# Patient Record
Sex: FEMALE | Race: WHITE | NOT HISPANIC OR LATINO | Employment: OTHER | URBAN - METROPOLITAN AREA
[De-identification: names, ages, dates, MRNs, and addresses within clinical notes are randomized per-mention and may not be internally consistent; named-entity substitution may affect disease eponyms.]

---

## 2017-04-01 ENCOUNTER — OFFICE VISIT (OUTPATIENT)
Dept: URGENT CARE | Facility: URGENT CARE | Age: 77
End: 2017-04-01
Payer: COMMERCIAL

## 2017-04-01 VITALS
TEMPERATURE: 99.2 F | WEIGHT: 167 LBS | SYSTOLIC BLOOD PRESSURE: 114 MMHG | HEART RATE: 82 BPM | OXYGEN SATURATION: 100 % | DIASTOLIC BLOOD PRESSURE: 62 MMHG

## 2017-04-01 DIAGNOSIS — R68.89 INFLUENZA-LIKE SYMPTOMS: Primary | ICD-10-CM

## 2017-04-01 PROCEDURE — 99202 OFFICE O/P NEW SF 15 MIN: CPT | Performed by: FAMILY MEDICINE

## 2017-04-01 RX ORDER — OSELTAMIVIR PHOSPHATE 75 MG/1
75 CAPSULE ORAL 2 TIMES DAILY
Qty: 10 CAPSULE | Refills: 0 | Status: SHIPPED | OUTPATIENT
Start: 2017-04-01 | End: 2019-05-02

## 2017-04-01 NOTE — NURSING NOTE
Chief Complaint   Patient presents with     Cough     cough, wheezing, fever and chills since Monday.        Initial /62  Pulse 82  Temp 99.2  F (37.3  C) (Oral)  Wt 167 lb (75.8 kg)  SpO2 100% There is no height or weight on file to calculate BMI.  Medication Reconciliation: complete

## 2017-04-01 NOTE — PROGRESS NOTES
SUBJECTIVE:  Aviva Lundberg is a 76 year old female who presents to the clinic today with a chief complaint of cough  for 2 day(s).  Her cough is described as nonproductive.    The patient's symptoms are moderate and worsening.  Associated symptoms include fever, nasal congestion and myalgias. The patient's symptoms are exacerbated by no particular triggers  Patient has been using OTC cough suppressants  to improve symptoms.    No past medical history on file.    Current Outpatient Prescriptions   Medication Sig Dispense Refill     GuaiFENesin (CHEST CONGESTION RELIEF PO) Take by mouth as needed       oseltamivir (TAMIFLU) 75 MG capsule Take 1 capsule (75 mg) by mouth 2 times daily 10 capsule 0       Social History   Substance Use Topics     Smoking status: Never Smoker     Smokeless tobacco: Never Used     Alcohol use Not on file       ROS  INTEGUMENTARY/SKIN: NEGATIVE for worrisome rashes, moles or lesions  EYES: NEGATIVE for vision changes or irritation    OBJECTIVE:  /62  Pulse 82  Temp 99.2  F (37.3  C) (Oral)  Wt 167 lb (75.8 kg)  SpO2 100%  GENERAL APPEARANCE: moderate distress  EYES: EOMI,  PERRL, conjunctiva clear  HENT: ear canals and TM's normal.  Nose and mouth without ulcers, erythema or lesions  NECK: supple, nontender, no lymphadenopathy  RESP: lungs clear to auscultation - no rales, rhonchi or wheezes  CV: regular rates and rhythm, normal S1 S2, no murmur noted  NEURO: Normal strength and tone, sensory exam grossly normal,  normal speech and mentation  SKIN: no suspicious lesions or rashes    ASSESSMENT:  Influenza    PLAN:  1. Influenza-like symptoms  Pt instructed to come back to the clinic for worsening sx     Symptomatic cares were discussed in detail.   - oseltamivir (TAMIFLU) 75 MG capsule; Take 1 capsule (75 mg) by mouth 2 times daily  Dispense: 10 capsule; Refill: 0

## 2017-04-01 NOTE — MR AVS SNAPSHOT
"              After Visit Summary   2017    Aviva Lundberg    MRN: [id number]           Patient Information     Date Of Birth          1940        Visit Information        Provider Department      2017 1:45 PM Mike Perez MD [facility]        Today's Diagnoses     Influenza-like symptoms    -  1       Follow-ups after your visit        Who to contact     If you have questions or need follow up information about today's clinic visit or your schedule please contact [facility] directly at [phone].  Normal or non-critical lab and imaging results will be communicated to you by [employer], letter or phone within 4 business days after the clinic has received the results. If you do not hear from us within 7 days, please contact the clinic through [employer] or phone. If you have a critical or abnormal lab result, we will notify you by phone as soon as possible.  Submit refill requests through [employer] or call your pharmacy and they will forward the refill request to us. Please allow 3 business days for your refill to be completed.          Additional Information About Your Visit        MyChart Information     [employer] lets you send messages to your doctor, view your test results, renew your prescriptions, schedule appointments and more. To sign up, go to [email or web address] . Click on \"Log in\" on the left side of the screen, which will take you to the Welcome page. Then click on \"Sign up Now\" on the right side of the page.     You will be asked to enter the access code listed below, as well as some personal information. Please follow the directions to create your username and password.     Your access code is: [email or web address]  Expires: 2017  2:31 PM     Your access code will  in 90 days. If you need help or a new code, please call your [place] clinic or [phone].        Care EveryWhere ID     This is your Care EveryWhere ID. This " could be used by other organizations to access your [facility] medical records  [id number]        Your Vitals Were     Pulse Temperature Pulse Oximetry             82 99.2  F (37.3  C) (Oral) 100%          Blood Pressure from Last 3 Encounters:   04/01/17 114/62    Weight from Last 3 Encounters:   04/01/17 167 lb (75.8 kg)              Today, you had the following     No orders found for display         Today's Medication Changes          These changes are accurate as of: 4/1/17  2:31 PM.  If you have any questions, ask your nurse or doctor.               Start taking these medicines.        Dose/Directions    oseltamivir 75 MG capsule   Commonly known as:  TAMIFLU   Used for:  Influenza-like symptoms   Started by:  Mike Perez MD        Dose:  75 mg   Take 1 capsule (75 mg) by mouth 2 times daily   Quantity:  10 capsule   Refills:  0            Where to get your medicines      These medications were sent to [facility] [address] [address]     Phone:  [phone]     oseltamivir 75 MG capsule                Primary Care Provider    None Specified       No primary provider on file.        Thank you!     Thank you for choosing [facility]  for your care. Our goal is always to provide you with excellent care. Hearing back from our patients is one way we can continue to improve our services. Please take a few minutes to complete the written survey that you may receive in the mail after your visit with us. Thank you!             Your Updated Medication List - Protect others around you: Learn how to safely use, store and throw away your medicines at www.disposemymeds.org.          This list is accurate as of: 4/1/17  2:31 PM.  Always use your most recent med list.                   Brand Name Dispense Instructions for use    CHEST CONGESTION RELIEF PO      Take by mouth as needed       oseltamivir 75 MG capsule     TAMIFLU    10 capsule    Take 1 capsule (75 mg) by mouth 2 times daily

## 2019-05-02 ENCOUNTER — OFFICE VISIT (OUTPATIENT)
Dept: SLEEP MEDICINE | Facility: CLINIC | Age: 79
End: 2019-05-02
Payer: COMMERCIAL

## 2019-05-02 VITALS
OXYGEN SATURATION: 98 % | WEIGHT: 172 LBS | BODY MASS INDEX: 29.37 KG/M2 | RESPIRATION RATE: 16 BRPM | HEIGHT: 64 IN | DIASTOLIC BLOOD PRESSURE: 75 MMHG | HEART RATE: 63 BPM | SYSTOLIC BLOOD PRESSURE: 137 MMHG

## 2019-05-02 DIAGNOSIS — G47.33 OSA (OBSTRUCTIVE SLEEP APNEA): Primary | ICD-10-CM

## 2019-05-02 PROCEDURE — 99204 OFFICE O/P NEW MOD 45 MIN: CPT | Performed by: PHYSICIAN ASSISTANT

## 2019-05-02 ASSESSMENT — MIFFLIN-ST. JEOR: SCORE: 1240.19

## 2019-05-02 NOTE — PROGRESS NOTES
Sleep Consultation:    Date on this visit: 5/2/2019    Aviva Lundberg is sent by No ref. provider found for a sleep consultation regarding KATINA.    Primary Physician: No Ref-Primary, Physician     Aviva Lundberg presents for management of previously diagnosed KATINA. Her medical history is non-contributory.    She had a sleep study at [facility] on 6/21/2018. Her AHI was 20.2/hr, RDI 46.3/hr, O2 eliza 78%. Her supine AHI was 25.4/hr and left lateral AH was 5.9/hr (in 50.6 minutes of sleep, unclear if she had REM laterally). Her PLM index was 73/hr, but only 2.6/hr were associated with arousals. Her weight was 176 pounds at that time.     She is on auto CPAP 8-13 cm. She has no problems sleeping since being on CPAP. Prior to that, she had daytime sleepiness and difficulty initiating and maintaining sleep. She uses a Dreamwear nasal mask. She is not told that she snores with CPAP. No dry mouth with rare exception. No significant mask leak. Her  will wake her if she has leak. She is comfortable with the pressures. She does use the humidifier set at 1. She thinks she has only replaced supplies once since last June.    The compliance data shows that s/he has used the CPAP for 30/30 nights, 100% of nights for >4 hours.  The 90th% pressure is 12.2 cm.  The average time in large leak is 5 min.  The average nightly usage is 7:37.  The average AHI is 2.1/hr. Her download shows occasional 10-12 hour nights, often followed by 4-5 hour nights. Her RERA index was 9.5/hr. There are nights where her pressure is at the upper limit with residual RERAs for half the night and the other half of the night the pressure is at the lower limit and she has no residual RERAs.      Aviva goes to sleep at 10:00 PM during the week. She wakes up at 6:00-9:00 AM with an alarm. She falls asleep in 15 minutes.  Aviva denies difficulty falling asleep.  She wakes up 1-2 times a nigh.  Aviva wakes up to go to the bathroom.  She sometimes chats with her granddaughter who works second shift. She may also finish a project that she started that day. Otherwise, she gets back to sleep quickly.  On weekends, her sleep is the same but she may sleep in 7 AM.  Patient gets an average of 7.5 hours of sleep per night. She used to take melatonin prior to being on CPAP.    Patient does read in bed (a few pages) and does not use electronics in bed, watch TV in bed and worry in bed about anything.     Aviva is retired.  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     Aviva does have a regular bed partner. They never sleep separately.  Patient sleeps on her back and side. If she sleeps laterally, she gets some hip pain. She denies morning headaches, morning confusion and restless legs. She used to have shooting pain in her thighs. The pain has migrated to now affect only the tops of her feet. She often feels her ankles are stiff in the morning. 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  Aviva denies any bruxism, sleep walking, sleep talking, dream enactment, sleep paralysis, cataplexy and hypnogogic/hypnopompic hallucinations.    Aviva denies difficulty breathing through her nose, claustrophobia, reflux at night, heartburn and depression.      Aviva has lost 10 pounds in the last 4 months. Her weight is down 4 pounds compared to her sleep study weight.  Patient describes themself as neither a morning or night person.  She would prefer to go to sleep at 10:00 PM and wake up at 9:00 AM.  Patient's Spelter Sleepiness score 3/24 inconsistent with daytime sleepiness.      Aviva naps 0-1 times per week for 10-20 minutes, feels refreshed after naps. She takes frequent inadvertant naps in [employer]. She also dozes as a passenger on her ride back from the farm (only if her  is driving).  She denies dozing while driving.  Patient was counseled on the importance of driving while alert, to pull over if  drowsy, or nap before getting into the vehicle if sleepy.  She uses no caffeine.     Allergies:    No Known Allergies    Medications:    No current outpatient medications on file.       Problem List:  Patient Active Problem List    Diagnosis Date Noted     KATINA (obstructive sleep apnea) 05/02/2019     Priority: Medium        Past Medical/Surgical History:  Past Medical History:   Diagnosis Date     KATINA (obstructive sleep apnea)      Temporal arteritis (H)     about 1990     Past Surgical History:   Procedure Laterality Date     ENT SURGERY      T & A age 6     GYN SURGERY      hysterectomy, uterine rupture during breech child birth       Social History:  Social History     Socioeconomic History     Marital status:      Spouse name: Not on file     Number of children: Not on file     Years of education: Not on file     Highest education level: Not on file   Occupational History     Not on file   Social Needs     Financial resource strain: Not on file     Food insecurity:     Worry: Not on file     Inability: Not on file     Transportation needs:     Medical: Not on file     Non-medical: Not on file   Tobacco Use     Smoking status: Never Smoker     Smokeless tobacco: Never Used   Substance and Sexual Activity     Alcohol use: Not on file     Comment: rare glass of wine at special event     Drug use: Never     Sexual activity: Not on file   Lifestyle     Physical activity:     Days per week: Not on file     Minutes per session: Not on file     Stress: Not on file   Relationships     Social connections:     Talks on phone: Not on file     Gets together: Not on file     Attends Latter-day service: Not on file     Active member of club or organization: Not on file     Attends meetings of clubs or organizations: Not on file     Relationship status: Not on file     Intimate partner violence:     Fear of current or ex partner: Not on file     Emotionally abused: Not on file     Physically abused: Not on file     Forced  sexual activity: Not on file   Other Topics Concern     Parent/sibling w/ CABG, MI or angioplasty before 65F 55M? Not Asked   Social History Narrative     Not on file       Family History:  Family History   Problem Relation Age of Onset     Chronic Obstructive Pulmonary Disease Mother      Heart Failure Mother          age 83     Heart Disease Father          age 93     Fuch's dystrophy Sister        Review of Systems:  A complete review of systems reviewed by me is negative with the exeption of what has been mentioned in the history of present illness.  CONSTITUTIONAL: NEGATIVE for weight gain/loss, fever, chills, sweats or night sweats.  CONSTITUTIONAL:  POSITIVE for  drug allergies -unknown antibiotic  EYES: NEGATIVE for blind spots, double vision.  EYES:  POSITIVE for changes in vision and blurry vision/watery eyes when reading late at night  ENT: NEGATIVE for ear pain, sore throat, sinus pain, post-nasal drip, bloody nose  ENT:  POSITIVE for  runny nose  CARDIAC: NEGATIVE for fast heartbeats or fluttering in chest, chest pain or pressure, breathlessness when lying flat, swollen legs or swollen feet.  NEUROLOGIC: NEGATIVE headaches, weakness or numbness in the arms or legs.  DERMATOLOGIC: NEGATIVE for rashes, new moles or change in mole(s)  PULMONARY: NEGATIVE SOB at rest, SOB with activity, dry cough, productive cough, coughing up blood, wheezing or whistling when breathing.    GASTROINTESTINAL: NEGATIVE for nausea or vomitting, loose or watery stools, fat or grease in stools, constipation, abdominal pain, bowel movements black in color or blood noted.  GENITOURINARY: NEGATIVE for pain during urination, blood in urine, urinating more frequently than usual, irregular menstrual periods.  MUSCULOSKELETAL: NEGATIVE for muscle pain, bone or joint pain, swollen joints.  MUSCULOSKELETAL:  POSITIVE for pain in feet  ENDOCRINE: NEGATIVE for increased thirst or urination, diabetes.  LYMPHATIC: NEGATIVE for  "swollen lymph nodes, lumps or bumps in the breasts or nipple discharge.    Physical Examination:  Vitals: /75   Pulse 63   Resp 16   Ht 1.626 m (5' 4\")   Wt 78 kg (172 lb)   SpO2 98%   BMI 29.52 kg/m        Neck Cir (cm): 37 cm    Moriches Total Score 5/2/2019   Total score - Moriches 3       THELMA Total Score: 0 (05/02/19 1410)    GENERAL APPEARANCE: healthy, alert, no distress and cooperative  EYES: Eyes grossly normal to inspection, PERRL, conjunctivae and sclerae normal and lids and lashes normal  HENT: nose and mouth without ulcers or lesions, oropharynx crowded, soft palate dependent and tongue base mildly enlarged- grooves around edge of tongue from teeth, but tongue is not high-riding  NECK: no adenopathy, no asymmetry, masses, or scars, thyroid normal to palpation and trachea midline and normal to palpation  RESP: lungs clear to auscultation - no rales, rhonchi or wheezes  CV: regular rates and rhythm, normal S1 S2, no S3 or S4, no murmur, click or rub and no irregular beats  LYMPHATICS: no cervical adenopathy  MS: extremities normal- no gross deformities noted  NEURO: Normal strength and tone, mentation intact, speech normal and cranial nerves 2-12 intact  Mallampati Class: IV.  Tonsillar Stage: 0  surgically removed.    Impression/Plan:    (G47.33) KATINA (obstructive sleep apnea)  (primary encounter diagnosis)  Comment: Aviva presents to establish care for previously diagnosed KATINA. Sleep study at [facility] on 6/21/2018 showed AHI 20.2/hr, RDI 46.3/hr, O2 eliza 78%. She has done well on auto CPAP 8-13 cm.  She has occasional mask leak with the DreamWear nasal mask.  She has only replaced the mask once in the last year.  It appears she may have some residual RERAs if she sleeps on her back.  She has been trying to sleep more supine to reduce hip pain.  She has some residual sleepiness, falling asleep in [employer] and as a passenger in a car.  Her ESS is normal at 3/24, and she denies any " drowsiness while she is driving.  Her sleep schedule is a bit irregular which could explain the residual sleepiness.  Plan: Comprehensive DME        Continue auto CPAP 8-13 cm. A prescription was written for new supplies.  We looked at alternative masks for her to try.  She will see Addison with [facility] after this visit.  She will try to sleep more laterally. We talked about trying to be more consistent with her sleep schedule to improve her sleep consolidation and daytime energy.      She will follow up with me in approximately 1 year.       Polysomnography reviewed.  Obstructive sleep apnea reviewed.  Complications of untreated sleep apnea were reviewed.  45 minutes was spent during this visit, over 50% in counseling and coordination of care.   Bennett Goltz, PA-C    CC: No ref. provider found

## 2019-07-02 ENCOUNTER — DOCUMENTATION ONLY (OUTPATIENT)
Dept: SLEEP MEDICINE | Facility: CLINIC | Age: 79
End: 2019-07-02

## 2019-07-09 ENCOUNTER — DOCUMENTATION ONLY (OUTPATIENT)
Dept: SLEEP MEDICINE | Facility: CLINIC | Age: 79
End: 2019-07-09
Payer: COMMERCIAL

## 2019-07-09 NOTE — PROGRESS NOTES
Pt came into the [place] sleep lab to get re-educated on pap therapy, maintenance, supplies and data. Pt initially made the appointment to get her machine and humidifier inspected for function. Pt had noticed the water level not changing from night to night and thought it may be damaged. Pt and I talked through how to adjust and check the humidifier for function, pt tested it over the weekend. The humidifier functions as expected.    Pt present with new questions. Pt was wondering about pap therapy and if it is working. Per pt download ahi is being corrected and leak is well controlled under the threshold of what the machine can handle. Pt does have RERA S  almost every night, I advised pt to bring this up with her provider if she is concerned however, AHI is corrected under 5. Pt reports feeling rested and benefit from cpap use.     Pt also had questions about cleaning and resupply. Pt was wondering how to tell if supplies are worn and need replacement. Pt and I discussed the general cleaning schedule of at least once weekly soak all supplies in a mild soap solution then air dry, replace filters about monthly sooner if needed and to wipe the machine down with a damp cloth. Then pt and I went over insurance eligibility along with supplies lifespan and cleanliness/ sanitation. Pt understood and was agreeable.     Pt and i then requested the release of pt's serial number from [employer] to add [facility] to receive data. Pt account is now linked to [facility] in [employer].

## 2020-05-05 VITALS — BODY MASS INDEX: 27.31 KG/M2 | HEIGHT: 64 IN | WEIGHT: 160 LBS

## 2020-05-05 ASSESSMENT — MIFFLIN-ST. JEOR: SCORE: 1180.76

## 2020-05-05 NOTE — PATIENT INSTRUCTIONS
A common problem for people with insomnia is spending too much time in bed  trying  to sleep.  You really should only be in bed for no more than 7-8 hours per night.  Spending too much time in bed can lead to being awake and having an  overactive  mind.  One effective way to address this problem is reducing how much time you spend in bed each night.  Be careful with driving or other dangerous activities when trying these strategeis however.  We recommend that you follow these steps to improve your insomnia:    Set a new sleep schedule where you reduce the time you spend in bed by 1-2 hours, e.g. Go to bed at 10 PM and get up at 6 AM. Avoid sleeping outside of this prescribed sleep schedule.    Keep this sleep schedule every day of the week including the weekends for two weeks.    After two weeks you can add 30 minutes more time in bed if you have been sleeping more soundly.    If you still aren t sleeping well, reduce the time you spend in bed by another 30 but not less than 7 hours per night.       Use the chin strap again and see if you notice less dry mouth and notice that the humidifier does not run out of water as much.      Your BMI is Body mass index is 27.46 kg/m .  Weight management is a personal decision.  If you are interested in exploring weight loss strategies, the following discussion covers the approaches that may be successful. Body mass index (BMI) is one way to tell whether you are at a healthy weight, overweight, or obese. It measures your weight in relation to your height.  A BMI of 18.5 to 24.9 is in the healthy range. A person with a BMI of 25 to 29.9 is considered overweight, and someone with a BMI of 30 or greater is considered obese. More than two-thirds of American adults are considered overweight or obese.  Being overweight or obese increases the risk for further weight gain. Excess weight may lead to heart disease and diabetes.  Creating and following plans for healthy eating and physical  activity may help you improve your health.  Weight control is part of healthy lifestyle and includes exercise, emotional health, and healthy eating habits. Careful eating habits lifelong are the mainstay of weight control. Though there are significant health benefits from weight loss, long-term weight loss with diet alone may be very difficult to achieve- studies show long-term success with dietary management in less than 10% of people. Attaining a healthy weight may be especially difficult to achieve in those with severe obesity. In some cases, medications, devices and surgical management might be considered.  What can you do?  If you are overweight or obese and are interested in methods for weight loss, you should discuss this with your provider.     Consider reducing daily calorie intake by 500 calories.     Keep a food journal.     Avoiding skipping meals, consider cutting portions instead.    Diet combined with exercise helps maintain muscle while optimizing fat loss. Strength training is particularly important for building and maintaining muscle mass. Exercise helps reduce stress, increase energy, and improves fitness. Increasing exercise without diet control, however, may not burn enough calories to loose weight.       Start walking three days a week 10-20 minutes at a time    Work towards walking thirty minutes five days a week     Eventually, increase the speed of your walking for 1-2 minutes at time    In addition, we recommend that you review healthy lifestyles and methods for weight loss available through the National Institutes of Health patient information sites:  http://win.niddk.nih.gov/publications/index.htm    And look into health and wellness programs that may be available through your health insurance provider, employer, local community center, or [employer] club.    Weight management plan: Patient was referred to their PCP to discuss a diet and exercise plan.

## 2020-05-05 NOTE — PROGRESS NOTES
"Aviva Lundberg is a 80 year old female who is being evaluated via a billable telephone visit.      The patient has been notified of following:     \"This telephone visit will be conducted via a call between you and your physician/provider. We have found that certain health care needs can be provided without the need for a physical exam.  This service lets us provide the care you need with a short phone conversation.  If a prescription is necessary we can send it directly to your pharmacy.  If lab work is needed we can place an order for that and you can then stop by our lab to have the test done at a later time.    Telephone visits are billed at different rates depending on your insurance coverage. During this emergency period, for some insurers they may be billed the same as an in-person visit.  Please reach out to your insurance provider with any questions.    If during the course of the call the physician/provider feels a telephone visit is not appropriate, you will not be charged for this service.\"    Patient has given verbal consent for Telephone visit?  Yes    What phone number would you like to be contacted at? [phone]    How would you like to obtain your AVS? E-Mail (inform patient AVS not encrypted)      CPAP Follow-Up Visit:    Date on this visit: 5/6/2020    Aviva Lundberg has a follow up of her CPAP use for KATINA. Her medical history is non-contributory.     She had a sleep study at [facility] on 6/21/2018. Her AHI was 20.2/hr, RDI 46.3/hr, O2 eliza 78%. Her supine AHI was 25.4/hr and left lateral AHI was 5.9/hr (in 50.6 minutes of sleep, unclear if she had REM laterally). Her PLM index was 73/hr, but only 2.6/hr were associated with arousals. Her weight was 176 pounds at that time. She was initially studied because her  was bothered by her snoring.     She feels the CPAP is working fine. She has been using her daughter's old CPAP when she goes to the farm, but has not been there " since February. She sometimes removes the mask in her sleep, she thinks related to nasal congestion. She has lost 15-20 pounds in the last 2 years. Her weight is 160 in the morning. She has only not used CPAP about 3 times, not recently though. She feels she sleeps fine without it. Her  did not mention observing snoring on those nights. She sleeps mostly supine with CPAP. She used to sleep more laterally. Her back has been sore lately. She uses a neck pillow to help keep her head straight. She has noticed benefit to using CPAP initially. She had fewer awakenings. Now, even with CPAP, she wakes and has trouble getting back to sleep. That has been happening more in the last 6 months.       PAP machine: [employer]. Pressure settings: 8-13 cm.    The compliance data shows that the patient used the CPAP for 30/30 nights, 96.7% of nights for >4 hours.  The 90th% pressure is 12.5 cm.  The average time in large leak is 2 min.  The average nightly usage is 6 hours.  The average AHI is 2.6/hr.       Interface:  Mask: Dreamwear nasal mask, replaced the cushion about a month ago  Chin strap: No. Used one for about a year but then thought she did not need it anymore.   Leak: Yes, her  notices it and will nudge her. She rarely wakes from mask leak. Seems to occur when sleeping laterally.   Using Humidifier: Yes, sometimes runs out overnight. She has the humidifier and hose set at 4.   Condensation in hose or mask: No     Difficulties with therapy:    [+] Snoring with CPAP: when mask is leaking  [-] Difficulty tolerating the pressure:  [-] Epistaxis/dry nose:   [-] Nasal congestion:  [+] Dry mouth:   [+] Mouth breathing:   [-] Pain/skin breakdown:      Improvements noted with CPAP:   [+] waking up more refreshed  [+] sleeping better with less arousals  [+] improved energy level during the day    Bedtime is 10-11 PM. She has even gone to bed as early as 8 PM. 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 She  normally gets up at 6 AM, even on weekends. She rarely naps.      Past medical/surgical history, family history, social history, medications and allergies were reviewed.      Problem List:  Patient Active Problem List    Diagnosis Date Noted     KATINA (obstructive sleep apnea) 05/02/2019     Priority: Medium        Impression/Plan:    (G47.33) KATINA (obstructive sleep apnea)  (primary encounter diagnosis)  Comment: Aviva has been doing reasonably well with CPAP. In the last 6 months or so, she has occasionally been waking and having difficulty getting back to sleep. She will then remove the CPAP on some nights. This may be related to an excessive sleep opportunity. She thought that maybe she did not need the CPAP anymore because she had lost 15 pounds. Her download shows her pressure is still >12 cm 90% of the time, not at the lower limit of 8 cm, which suggests it is still sensing obstruction. She has been having mouth leak more than usual, but also stopped using her chin strap.  Plan: Continue auto CPAP 8-13 cm. Try a chin strap again to reduce mouth leak. If still having mouth leak, we could try reducing the pressures to 8-12 cm.    (G47.00) Insomnia, unspecified type  Comment: She has been having prolonged middle of the night awakenings occasionally. She has been going to bed as early as 8 PM on some nights. She used to only sleep 6-7 hours when working. Now she is in bed 7-10 hours.   Plan: She was advised to reduce her time in bed to 8 hours (10 PM to 6 AM). We discussed pushing her bedtime 30 minutes later if still having prolonged awakenings after 2 weeks. If that does not help, try pushing the bedtime to 11 PM. Keep the wake time at 6 AM. Continue to avoid sleep outside of that prescribed sleep schedule.      She will follow up with me in about 2-3 month(s).     28 minutes (2 PM to 2:28 PM) spent with patient, all of which were spent face-to-face counseling, consulting, coordinating plan of care.       Bennett Goltz, PA-C    CC: No ref. provider found

## 2020-05-06 ENCOUNTER — OFFICE VISIT (OUTPATIENT)
Dept: SLEEP MEDICINE | Facility: CLINIC | Age: 80
End: 2020-05-06
Payer: COMMERCIAL

## 2020-05-06 DIAGNOSIS — G47.00 INSOMNIA, UNSPECIFIED TYPE: ICD-10-CM

## 2020-05-06 DIAGNOSIS — G47.33 OSA (OBSTRUCTIVE SLEEP APNEA): Primary | ICD-10-CM

## 2020-05-06 PROCEDURE — 99214 OFFICE O/P EST MOD 30 MIN: CPT | Mod: 95 | Performed by: PHYSICIAN ASSISTANT

## 2020-08-03 VITALS — BODY MASS INDEX: 29.77 KG/M2 | WEIGHT: 168 LBS | HEIGHT: 63 IN

## 2020-08-03 ASSESSMENT — MIFFLIN-ST. JEOR: SCORE: 1201.17

## 2020-08-03 NOTE — PATIENT INSTRUCTIONS
Try the Slumberbump or Medcline pillow to help you stay off of your back. If you can get comfortable with that and CPAP for a couple of weeks, contact me at [phone]. I will look at a download from your CPAP. If the pressure is often at the lower limit, we can consider a sleep study to see if keeping you off of your back would be an effective treatment for your apnea by itself (without CPAP).  Continue to work on weight loss.  Contact [facility] to discuss alternative masks.  If you have prolonged middle of the night awakenings, try getting out of bed and reading to help distract you and get you tired so you can get back to sleep.  Insomnia - Stimulus Control  When someone lays awake in bed over many nights, your body can actually learn to be awake in bed, mainly because that is what has happened so many times.  Your body has actually been  conditioned  or trained to be awake during the night because it has happened so often.  To break this habit you should try to follow these steps to improve your insomnia:    Set a strict bedtime and rise time to keep every day of the week, including weekends    Go to bed at the set time, but only if you are sleepy (not tired or fatigued but drowsy)    Don t lay in bed for more than 15-30 minutes if you can t sleep.  Get up and go do something relaxing like reading or watching TV until you get drowsy again     Get up at the same time every day regardless of how much sleep you get    Use the bedroom only for sleep and sex.  Do NOT watch TV, read, use the computer, play on your cell phone or do work while in bed      Do not take naps during the daytime and avoid any situations where you might get drowsy or fall asleep unintentionally especially in the evening.     Your BMI is Body mass index is 29.76 kg/m .  Weight management is a personal decision.  If you are interested in exploring weight loss strategies, the following discussion covers the approaches that may be  successful. Body mass index (BMI) is one way to tell whether you are at a healthy weight, overweight, or obese. It measures your weight in relation to your height.  A BMI of 18.5 to 24.9 is in the healthy range. A person with a BMI of 25 to 29.9 is considered overweight, and someone with a BMI of 30 or greater is considered obese. More than two-thirds of American adults are considered overweight or obese.  Being overweight or obese increases the risk for further weight gain. Excess weight may lead to heart disease and diabetes.  Creating and following plans for healthy eating and physical activity may help you improve your health.  Weight control is part of healthy lifestyle and includes exercise, emotional health, and healthy eating habits. Careful eating habits lifelong are the mainstay of weight control. Though there are significant health benefits from weight loss, long-term weight loss with diet alone may be very difficult to achieve- studies show long-term success with dietary management in less than 10% of people. Attaining a healthy weight may be especially difficult to achieve in those with severe obesity. In some cases, medications, devices and surgical management might be considered.  What can you do?  If you are overweight or obese and are interested in methods for weight loss, you should discuss this with your provider.     Consider reducing daily calorie intake by 500 calories.     Keep a food journal.     Avoiding skipping meals, consider cutting portions instead.    Diet combined with exercise helps maintain muscle while optimizing fat loss. Strength training is particularly important for building and maintaining muscle mass. Exercise helps reduce stress, increase energy, and improves fitness. Increasing exercise without diet control, however, may not burn enough calories to loose weight.       Start walking three days a week 10-20 minutes at a time    Work towards walking thirty minutes five days a  week     Eventually, increase the speed of your walking for 1-2 minutes at time    In addition, we recommend that you review healthy lifestyles and methods for weight loss available through the National Institutes of Health patient information sites:  http://win.niddk.nih.gov/publications/index.htm    And look into health and wellness programs that may be available through your health insurance provider, employer, local community center, or [employer].

## 2020-08-03 NOTE — PROGRESS NOTES
"Aviva Lundberg is a 80 year old female who is being evaluated via a billable telephone visit.      The patient has been notified of following:     \"This telephone visit will be conducted via a call between you and your physician/provider. We have found that certain health care needs can be provided without the need for a physical exam.  This service lets us provide the care you need with a short phone conversation.  If a prescription is necessary we can send it directly to your pharmacy.  If lab work is needed we can place an order for that and you can then stop by our lab to have the test done at a later time.    Telephone visits are billed at different rates depending on your insurance coverage. During this emergency period, for some insurers they may be billed the same as an in-person visit.  Please reach out to your insurance provider with any questions.    If during the course of the call the physician/provider feels a telephone visit is not appropriate, you will not be charged for this service.\"    Patient has given verbal consent for Telephone visit?  Yes    What phone number would you like to be contacted at? [phone]    How would you like to obtain your AVS? [facility]    CPAP Follow-Up Visit:    Date on this visit: 8/4/2020    Aviva Lundberg has a follow up of her CPAP use for AKTINA and insomnia. Her medical history is non-contributory.     She had a sleep study at [facility] on 6/21/2018. Her AHI was 20.2/hr, RDI 46.3/hr, O2 eliza 78%. Her supine AHI was 25.4/hr and left lateral AHI was 5.9/hr (in 50.6 minutes of sleep, unclear if she had REM laterally). Her PLM index was 73/hr, but only 2.6/hr were associated with arousals. Her weight was 176 pounds at that time. She was initially studied because her  was bothered by her snoring.      Today's visit is primarily to discuss insomnia management. She had been having prolonged middle of the night awakenings occasionally. She has been " "going to bed as early as 8 PM on some nights. She used to only sleep 6-7 hours when working. Now she is in bed 7-10 hours.     She was advised to reduce her time in bed to 8 hours (10 PM to 6 AM. She has been going to bed 10-11 PM and avoiding napping. She is normally up at 6 AM. She gets up and watches [employer] around 7 AM. She may doze inadvertently then. Her 58 year old transgender daughter is dying of cancer. She sometimes wakes around 1-2 AM for the restroom and she starts thinking and can't get back to sleep. That has been an issue in the last 2 weeks. Some nights, she sleeps through the night. It may be a couple nights per week that she wakes. She will do work or chores in the night.     She has sometimes been removing the mask in the night without knowing it.      PAP machine: [employer]. Pressure settings: 8-13 cm.    The compliance data shows that the patient used the CPAP for 27/30 nights, 90% of nights for >4 hours.  The 90th% pressure is 12.9 cm.  The average time in large leak is 2 min 9 sec (but she has elevated leak at times that does not register as \"large leak\").  The average nightly usage is 6:18.  The average AHI is 3.3/hr.      Interface:  Mask: Dreamwear nasal mask  Chin strap: No  Leak: Yes/No  Using Humidifier: Yes  Condensation in hose or mask: No     Difficulties with therapy:    [-] Snoring with CPAP:  [-] Difficulty tolerating the pressure:  [-] Epistaxis/dry nose:   [-] Nasal congestion:  [-] Dry mouth:  [-] Mouth breathing:   [-] Pain/skin breakdown:        Weight change since sleep study: she got down to 155, but is going back up again. Her weight is 168 now.     Past medical/surgical history, family history, social history, medications and allergies were reviewed.      Problem List:  Patient Active Problem List    Diagnosis Date Noted     KATINA (obstructive sleep apnea) 05/02/2019     Priority: Medium        Impression/Plan:    (G47.33) KATINA (obstructive sleep apnea)  (primary " encounter diagnosis)  Comment: Leak is slightly high. Pressure often goes to the upper limit at times. She sleeps mostly supine. She sometimes removes the mask in her sleep. She would like to be able to sleep without it. Her sleep study showed her apnea was very positional, but she only had 50 minutes lateral.  Plan: Continue auto CPAP 8-13 cm. She will talk to [facility] about other masks. We talked about trying positional restriction with Slumberbump or the Medcline pillow. If she tries that with CPAP, we can see if her pressure requirement is drastically reduced. If so, we will consider repeating a sleep study with positional restriction alone. She will also continue to work on weight loss, aiming for about 155 again. She will contact me once she has had an opportunity to try these things.     (G47.00) Insomnia, unspecified type  Comment: She has occasional prolonged middle of the night awakenings. She gets up for the restroom and then starts thinking about her daughter who is dying of cancer. She gets up and works.  Plan: She was advised to try reading in the middle of the night instead of doing something reinforcing for being up. We talked about a short term sleep aid, but she declined.     She will follow up with me in about 1 year(s).     23 minutes (10:33 AM-10:56 AM) spent with patient, all of which were spent face-to-face counseling, consulting, coordinating plan of care.      Bennett Goltz, PA-C    CC: No ref. provider found

## 2020-08-04 ENCOUNTER — VIRTUAL VISIT (OUTPATIENT)
Dept: SLEEP MEDICINE | Facility: CLINIC | Age: 80
End: 2020-08-04
Payer: COMMERCIAL

## 2020-08-04 DIAGNOSIS — G47.33 OSA (OBSTRUCTIVE SLEEP APNEA): Primary | ICD-10-CM

## 2020-08-04 DIAGNOSIS — G47.00 INSOMNIA, UNSPECIFIED TYPE: ICD-10-CM

## 2020-08-04 PROCEDURE — 99214 OFFICE O/P EST MOD 30 MIN: CPT | Mod: 95 | Performed by: PHYSICIAN ASSISTANT

## 2024-04-17 DIAGNOSIS — G47.33 OBSTRUCTIVE SLEEP APNEA (ADULT) (PEDIATRIC): Primary | ICD-10-CM

## 2024-04-18 NOTE — PROGRESS NOTES
CPAP DME order signed. Last visit was in 2020. No MyChart or Synopsis. Will route to schedulers to have her get on the schedule.  Bennett Goltz, PA-C

## 2024-05-13 NOTE — PROGRESS NOTES
CPAP Follow-Up Visit:    Date on this visit: 5/14/2024    Aviva Lundberg has a follow up of her CPAP use for KATINA and insomnia. Her medical history is non-contributory.     She had a sleep study at [facility] on 6/21/2018. Her AHI was 20.2/hr, RDI 46.3/hr, O2 eliza 78%. Her supine AHI was 25.4/hr and left lateral AHI was 5.9/hr (in 50.6 minutes of sleep, unclear if she had REM laterally). Her PLM index was 73/hr, but only 2.6/hr were associated with arousals. Her weight was 176 pounds at that time. She was initially studied because her  was bothered by her snoring.        Respironics Dreamstation 2 with pressures 8-20 cm  The compliance data shows that the patient used the CPAP for 78/90 nights, 81.1% of nights for >4 hours.  The 90th% pressure is 15.3 cm.  The average time in large leak is 16 min.  The average nightly usage is 4:56.  The average AHI is 10.6/hr (4.3/hr OA, 6/hr hypop). Her RERA index is 12.1/hr       Aviva was seen by me in 2020. She was concerned that her Dreamstation 2 is under recall. She has not been able to get new supplies in a year, so her mask is leaking. She will go without it on an occasional night and she sometimes sleeps better without it (although not always). She does not really notice a clear benefit to her sleep from CPAP. She sometimes wakes in the night and looks to see if she met her 4 hours of use so she can remove it.   She sleeps on her back to prevent mask leak. She prefers to sleep on her side.  Her  once in a while hears some snoring when she dozes in the recliner watching TV. He does not really notice her snoring when she does not use CPAP in bed.          No specialty comments available.    Do you use a CPAP Machine at home: Yes  Overall, on a scale of 0-10 how would you rate your CPAP (0 poor, 10 great): 5    What type of mask do you use: Full Face Mask  F30i. She just got an AirTouch N20.   Is your mask comfortable: No  If not, why: doesnt  fit right  too old  How often do you replace supplies: has not replaced supplies in a year    Is your mask leaking: Yes  If yes, where do you feel it: around nose  How many night per week does the mask leak (0-7): 7    Do you notice snoring with mask on: No  Do you notice gasping arousals with mask on: Yes  Are you having significant oral or nasal dryness: Yes  Are you using the humidifier: yes   Does the water chamber run out before the night is over:yes  Do you get condensation in the mask or hose:no  Is the pressure setting comfortable: Yes  If not, why:      Typical bedtime: 10 pm  Sleep latency on PAP therapy: sometimes over hour  Typical wake time: 10 am. She wakes at 7 AM and goes to her recliner. She sleeps there for 3 hours.   Wakes 2 times per night for 10 minutes. Reason for waking: restroom. If she wakes for something other than the restroom and her mind kicks on, she has difficulty getting back to sleep.   How many hours on average per night are you using PAP therapy: 4 hours  How many hours are you sleeping per night:    Do you feel well rested in the morning: No    Naps: She used to nap 3-4 times per day. She will fall asleep in [employer]. She thinks she has not napped in a week because she was doing a garage sale.   They have struggled with medical bills for their daughter. 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     Their daughter gets low blood sugar in the night and the alarm is on Aviva's phone so she can help if their daughter is unresponsive. She also has a tumor on her pancreas.   Aviva has a lot of guilt because her daughter (who is transgender) was abused in [employer] and Aviva would not let her quit (before finding out about the abuse).  Aviva has a counselor through [facility].       Weight change since sleep study: 169 lbs      Past medical/surgical history, family history, social history, medications and allergies were reviewed.      Problem List:  Patient Active Problem  "List    Diagnosis Date Noted    KATINA (obstructive sleep apnea) 05/02/2019     Priority: Medium        Impression/Plan:    (G47.33) KATINA (obstructive sleep apnea)  (primary encounter diagnosis)  Comment: Aviva is meeting compliance, but she is struggling with CPAP. Her mask is old and leaks. She tries to sleep on her back to maintain a mask seal. She does not prefer to sleep on her back. The PSG from 2018 does not show very significant apnea when lateral (AHI 5.9/hr). she did just get an AirTouch N20 mask but has not tried it yet.  Plan: Comprehensive DME, Comprehensive Sleep Study        In lab PSG with positional restriction to see if the apnea could be controlled by positional restriction. She will be informed to stop CPAP 3 days prior to the sleep study.  Continue CPAP for now, try the new mask to see if that is better tolerated.     (F51.04) Chronic insomnia  Comment: In bed 12 hours and has a lot of racing mind worrying about finances and her daughter. She has a lot of guilt about her role in her daughter's abuse. She does individual counseling but has not discussed her concerns with her daughter. She also has not fully accepted her daughter as transgender.  Plan: We discussed sleep compression. She was encouraged to try to compartmentalize her worries earlier in the evening by deliberately having a set \"worry time.\" She was encouraged to consider family counseling.     She will follow up with me in about 3 month(s) after her sleep study.     45 minutes were spent on the date of the encounter doing chart review, history and exam, documentation and further activities as noted above.     Bennett Goltz, PA-C    CC: Ceci Pryor, APRN, CNP    [facility]   [address] [place] [address]   [phone]         "

## 2024-05-14 ENCOUNTER — OFFICE VISIT (OUTPATIENT)
Dept: SLEEP MEDICINE | Facility: CLINIC | Age: 84
End: 2024-05-14
Payer: COMMERCIAL

## 2024-05-14 VITALS
HEIGHT: 63 IN | HEART RATE: 66 BPM | SYSTOLIC BLOOD PRESSURE: 148 MMHG | OXYGEN SATURATION: 99 % | DIASTOLIC BLOOD PRESSURE: 90 MMHG | WEIGHT: 169 LBS | BODY MASS INDEX: 29.95 KG/M2

## 2024-05-14 DIAGNOSIS — F51.04 CHRONIC INSOMNIA: ICD-10-CM

## 2024-05-14 DIAGNOSIS — G47.33 OSA (OBSTRUCTIVE SLEEP APNEA): Primary | ICD-10-CM

## 2024-05-14 PROCEDURE — 99204 OFFICE O/P NEW MOD 45 MIN: CPT | Performed by: PHYSICIAN ASSISTANT

## 2024-05-14 RX ORDER — CELECOXIB 100 MG/1
CAPSULE ORAL
COMMUNITY

## 2024-05-14 RX ORDER — PREDNISOLONE ACETATE 10 MG/ML
SUSPENSION/ DROPS OPHTHALMIC
COMMUNITY
Start: 2024-05-13

## 2024-05-14 RX ORDER — OXYBUTYNIN CHLORIDE 5 MG/1
5 TABLET, EXTENDED RELEASE ORAL DAILY
COMMUNITY
Start: 2024-04-09 | End: 2025-04-09

## 2024-05-14 ASSESSMENT — SLEEP AND FATIGUE QUESTIONNAIRES
HOW LIKELY ARE YOU TO NOD OFF OR FALL ASLEEP WHILE SITTING AND READING: SLIGHT CHANCE OF DOZING
HOW LIKELY ARE YOU TO NOD OFF OR FALL ASLEEP WHEN YOU ARE A PASSENGER IN A CAR FOR AN HOUR WITHOUT A BREAK: HIGH CHANCE OF DOZING
HOW LIKELY ARE YOU TO NOD OFF OR FALL ASLEEP WHILE WATCHING TV: HIGH CHANCE OF DOZING
HOW LIKELY ARE YOU TO NOD OFF OR FALL ASLEEP WHILE LYING DOWN TO REST IN THE AFTERNOON WHEN CIRCUMSTANCES PERMIT: HIGH CHANCE OF DOZING
HOW LIKELY ARE YOU TO NOD OFF OR FALL ASLEEP IN A CAR, WHILE STOPPED FOR A FEW MINUTES IN TRAFFIC: WOULD NEVER DOZE
HOW LIKELY ARE YOU TO NOD OFF OR FALL ASLEEP WHILE SITTING AND TALKING TO SOMEONE: SLIGHT CHANCE OF DOZING
HOW LIKELY ARE YOU TO NOD OFF OR FALL ASLEEP WHILE SITTING QUIETLY AFTER LUNCH WITHOUT ALCOHOL: SLIGHT CHANCE OF DOZING
HOW LIKELY ARE YOU TO NOD OFF OR FALL ASLEEP WHILE SITTING INACTIVE IN A PUBLIC PLACE: MODERATE CHANCE OF DOZING

## 2024-05-14 NOTE — PATIENT INSTRUCTIONS
Aviva to follow up with Primary Care provider regarding elevated blood pressure.       General recommendations for sleep problems (Insomnia)  Allow 2-4 weeks to see results     Establish a regular sleep schedule    Most people only need 7-8 hours of sleep.  Don't be in bed longer than you need     to sleep or you will end up spending more time awake in bed. This trains your    brain to think of the bed as a place to not sleep.  Go to bed at same time each night   Get up at same time each day - Set an alarm everyday (even weekends). This is one of    the most important tips. It prevents you from relying on your insomnia to get you    up on time for your day. That actually reinforces insomnia. It also will help your    body get into a pattern where you start feeling tired at a consistent time each    night.  The body functions best when you keep a consistent routine.  Avoid sleeping-in and napping. Anytime you sleep during the day, you will be less tired at    night. You may be tired enough to fall asleep, but you will wake more in the    middle of the night because you will have met your sleep need before the night is    done.   Cut down time in bed (if not asleep, get up)- Use your bed only for sleep and sex    Anytime you spend time in bed doing activities other than sleep (reading,    watching TV, working, playing on the computer or phone, or even just laying in    bed trying to sleep), you are training  your brain to think of the bed as a place to    do activities other than sleep. If you are not falling asleep within 20-30 minutes,    get out of bed. While out of bed, avoid bright lights. Avoid work or chores. Being    productive in the middle of the night reinforces waking up at night. Find relaxing,    not particularly entertaining activities like reading, listening to music, or relaxation    exercises. Go back to bed if you start feeling groggy, or after about 30 minutes,    even if not feeling very tired.  Sometimes, just getting out of bed stops the pattern    of getting frustrated about laying in bed not sleeping, and that can help you fall    asleep.   Avoid trying to force yourself to sleep- sleep is not like everything else. The harder you    work at most things, the more you can accomplish. The harder you work at    sleep, the less you will sleep.     Make the bedroom comfortable - quiet, dark and cool are better. Consider ear plugs    (silicon). Use dark blinds or wear an eye mask if needed     Make a relaxing routine prior to bedtime  Relaxation exercises:   Progressive muscle relaxation: Relax each muscle group individually    Begin with your feet, flex, then relax. Try to imagine your feet feeling heavy and sinking into the bed. Move to your calves, do the same thing. Work through each muscle group toward your head.    Relaxing Mental Imagery: Try to imagine a trip that you took and found relaxing, or imagine a day at the beach. Try to walk yourself through the day in your mind as if you were dreaming it. Try to imagine sensing the different experiences, such as feeling sand between your toes, the heat of the sun on your skin, seeing the waves crashing the shore, the smell of the salt water, etc.     Deal with your worries before bedtime    Set aside a worry time around dinner time for 10-15 minutes. Write down the    things that are on your mind. Plan time in the coming days to address those    issues. Brainstorm ideas on how you will deal with them. Try to identify issues    that are out of your control, and try to let those issues go.  Listen to relaxation tapes   Classical Music or Nature sounds   Back Massage   Get regular exercise each day (at least 1-2 hours before bedtime)   Take medications only as directed   Eat a light bedtime snack or warm drink   Warm milk   Warm herbal tea (non-caffeinated)       Things to avoid   No overstimulating activities just before bed   No competitive games before  bedtime   No exciting television programs before bedtime   Avoid caffeine after lunchtime   Avoid chocolate   Do not use alcohol to induce sleep (worsens Insomnia)   Do not take someone else's sleeping pills   Do not look at the clock when awakening   Do not turn on light when getting up to use bathroom, use a nightlight     Online Programs   www.SHUTi.me (pronounced shut eye). There is a fee for this program. Enter the code  [facility]  if you decide to enroll in this program.    www.sleepIO.com (pronounced sleep ee oh). There is a fee for this program. Enter the code  [facility]  if you decide to enroll in this program.     Suggested Resources  Insomnia Treatment Books   Overcoming Insomnia by Roc Casper and Destiney Lam (2008)  No More Sleepless Nights by Lazarsu Lyon and Anjelica Otoole (1996)  Say Alberto to Insomnia by Ankush Vora (2009)  The Insomnia Workbook by Susan Kessler and Homer Angel (2009)  The Insomnia Answer by Jeronimo Sanchez and Reji Washington (2006)      Stress Management and Relaxation Books  The Relaxation and Stress Reduction Workbook by Fabi Renee, Ashlie Ozuna and Kareem Hawkins (2008)  Stress Management Workbook: Techniques and Self-Assessment Procedures by Renee Walters and Surinder Jansen (1997)  A Mindfulness-Based Stress Reduction Workbook by Serjio Mason and Ngoc Carmichael (2010)  The Complete Stress Management Workbook by Sami Davis, Ricardo Pablo and Yg Feng (1996)  Assert Yourself by Carolyn Haywood and Dong Haywood (1977)    Relaxation Resources for Computer Download   These websites offer resources to help you relax. This list is for information only. [facility] is not responsible for the quality of services or the actions of any person or organization.  Progressive Muscle Relaxation (PMR):   http://www.[employer].com/progressive-muscle-relaxation-exercise.html    http://studentsupport.[facility]/counseling/resources/self-help/relaxation-and-stress-management/   Deep Breathing Exercises:  http://www.[employer]/breathing-awareness.html     Meditation:   www.HYGIEIA  www.[employer]meditation-site.[employer] You may have to pay for some of these resources.    Guided Imagery:  http://www.H[employer] Bundle/guided-imagery-scripts.html   http://[employer]/library/ebkicnxfsp-cpgnyd-snlmntw/   Consider phone apps such as: Calm, Headspace or Insight Timer.    Counseling / Behavioral Health  [facility]  Visit www.[email or web address].org or call [phone] to find a clinic close to you.  Or call [phone] for [facility].

## 2024-05-14 NOTE — Clinical Note
I forgot to tell this person to make sure to stop CPAP 3 days prior to the sleep study to avoid getting a false negative. Call you call her and remind her please and thanks?

## 2024-05-14 NOTE — NURSING NOTE
"Chief Complaint   Patient presents with    CPAP Follow Up       Initial BP (!) 148/90   Pulse 66   Ht 1.6 m (5' 3\")   Wt 76.7 kg (169 lb)   SpO2 99%   BMI 29.94 kg/m   Estimated body mass index is 29.94 kg/m  as calculated from the following:    Height as of this encounter: 1.6 m (5' 3\").    Weight as of this encounter: 76.7 kg (169 lb).    Medication Reconciliation: complete  ESS 14  Ange Gonzales MA   "
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
## 2024-05-15 NOTE — PROGRESS NOTES
Called Aviva to let her know to stop her CPAP three days prior to sleep study on 7/14/24. No further concerns.

## 2024-07-14 ENCOUNTER — THERAPY VISIT (OUTPATIENT)
Dept: SLEEP MEDICINE | Facility: CLINIC | Age: 84
End: 2024-07-14
Attending: PHYSICIAN ASSISTANT
Payer: COMMERCIAL

## 2024-07-14 DIAGNOSIS — G47.33 OSA (OBSTRUCTIVE SLEEP APNEA): ICD-10-CM

## 2024-07-14 PROCEDURE — 95810 POLYSOM 6/> YRS 4/> PARAM: CPT | Performed by: PSYCHIATRY & NEUROLOGY

## 2024-07-15 NOTE — PATIENT INSTRUCTIONS
[facility]    1. Your sleep study will be reviewed by a sleep physician within the next few days.     2. Please follow up in the sleep clinic as scheduled, or, make an appointment with your sleep provider to be seen within two weeks to discuss the results of the sleep study.    3. If you have any questions or problems with your treatment plan, please contact your sleep clinic provider at [phone] to further manage your condition.    4. Please review your attached medication list, and, at your follow-up appointment advise your sleep clinic provider about any changes.    5. Go to [email or web address] for more information about your sleep problems.    Issa IVORY RRT, RPSGT  July 15, 2024

## 2024-07-17 NOTE — PROCEDURES
" SLEEP STUDY INTERPRETATION  DIAGNOSTIC POLYSOMNOGRAPHY REPORT      Patient: LEENA DANIELLE  YOB: 1940  Study Date: 7/14/2024  MRN: [id number]  Referring Provider: Goltz, Bennett, PA-C  Ordering Provider: Goltz, Bennett, PA-C    Indications for Polysomnography: The patient is a 84 year old Female who is 5' 3\" and weighs 169.0 lbs. Her BMI is 29.9, [place] sleepiness scale 14/24 and neck circumference is 35 cm. Relevant medical history includes KATINA. A diagnostic polysomnogram was performed to evaluate for sleep apnea while non-supine.    Polysomnogram Data: A full night polysomnogram recorded the standard physiologic parameters including EEG, EOG, EMG, ECG, nasal and oral airflow. Respiratory parameters of chest and abdominal movements were recorded with respiratory inductance plethysmography. Oxygen saturation was recorded by pulse oximetry. Hypopnea scoring rule used: 1B 4%.    Sleep Architecture: Sleep fragmentation  The total recording time of the polysomnogram was 535.4 minutes. The total sleep time was 388.5 minutes. Sleep latency was 27.0 minutes. REM latency was 302.0 minutes. Arousal index was 20.5 arousals per hour. Sleep efficiency was 72.6%. Wake after sleep onset was 119.5 minutes. The patient spent 9.7% of total sleep time in Stage N1, 57.9% in Stage N2, 7.5% in Stage N3, and 25.0% in REM. Time in REM supine was 0 minutes.    Respiration: Mild, predominantly REM related, KATINA with hypoxemia while lateral.   Events ? The polysomnogram revealed a presence of 1 obstructive, - central, and - mixed apneas resulting in an apnea index of 0.2 events per hour. There were 66 obstructive hypopneas and - central hypopneas resulting in an obstructive hypopnea index of 10.2 and central hypopnea index of - events per hour. The combined apnea/hypopnea index was 10.3 events per hour (central apnea/hypopnea index was - events per hour). The REM AHI was 36.5 events per hour. The supine AHI was 10.6 events " per hour. The RERA index was 0.8 events per hour.  The RDI was 11.1 events per hour.  Snoring - was reported as moderate-loud.  Respiratory rate and pattern - was notable for normal respiratory rate and pattern.  Sustained Sleep Associated Hypoventilation - Transcutaneous carbon dioxide monitoring was not used.  Sleep Associated Hypoxemia - (Greater than 5 minutes O2 sat at or below 88%) was present. Baseline oxygen saturation was 93.7%. Lowest oxygen saturation was 82.0%. Time spent less than or equal to 88% was 7.2 minutes. Time spent less than or equal to 89% was 11.9 minutes.    Movement Activity: Elevated PLMs  Periodic Limb Activity - There were 857 PLMs during the entire study. The PLM index was 132.4 movements per hour. The PLM Arousal Index was 13.7 per hour.  REM EMG Activity - Excessive muscle activity was not present.  Nocturnal Behavior - Abnormal sleep related behaviors were not noted.  Bruxism - None apparent.    Cardiac Summary: Limited one lead EKG with signal often obscured by artifact.  Regardless the patient appeared to have predominantly narrow QRS complexes preceded by P waves suggestive of sinus rhythm.    The average pulse rate was 54.8 bpm. The minimum pulse rate was 47.0 bpm while the maximum pulse rate was 82.0 bpm.      Assessment:   Mild, predominantly REM related, KATINA with hypoxemia while lateral.   Elevated PLMs    Recommendations:  Consider treatment with the following options:  Dental Appliance  Auto CPAP  Upper Airway Surgery  Position restriction device to prevent the patient from sleeping supine  Advice regarding the risks of drowsy driving.  Suggest optimizing sleep schedule and avoiding sleep deprivation.  Treatment of PLMs (dopaminergic agents or delta-2 ligands) should be targeted at patients who either have wakeful motor restlessness or those in whom there is a high clinical suspicion of periodic limb movement disorder and not for elevated PLMs alone.    Diagnostic Codes:  G47.33, G47.36, G47.9       Mitesh Mcneal MD 7-  Diplomate, Sleep Medicine  American Board of Psychiatry and Neurology

## 2024-07-18 LAB — SLPCOMP: NORMAL

## 2024-12-09 NOTE — PROGRESS NOTES
Sleep Follow-Up Visit:    Date on this visit: 12/10/2024    Aviva Lundberg comes in today for follow-up of her sleep study done on 7/14/2024. Aviva Lundberg was initially seen for previously diagnosed KATINA and insomnia. Her medical history is non-contributory.     She had a sleep study at [facility] on 6/21/2018. Her AHI was 20.2/hr, RDI 46.3/hr, O2 eliza 78%. Her supine AHI was 25.4/hr and left lateral AHI was 5.9/hr (in 50.6 minutes of sleep, unclear if she had REM laterally). Her PLM index was 73/hr, but only 2.6/hr were associated with arousals. Her weight was 176 pounds at that time. She was initially studied because her  was bothered by her snoring.           PSG Results 7/14/2024:  Weight: 169 pounds  Sleep Architecture: Sleep fragmentation  The total recording time of the polysomnogram was 535.4 minutes. The total sleep time was 388.5 minutes. Sleep latency was 27.0 minutes. REM latency was 302.0 minutes. Arousal index was 20.5 arousals per hour. Sleep efficiency was 72.6%. Wake after sleep onset was 119.5 minutes. The patient spent 9.7% of total sleep time in Stage N1, 57.9% in Stage N2, 7.5% in Stage N3, and 25.0% in REM. Time in REM supine was 0 minutes.     Respiration: Mild, predominantly REM related, KATINA with hypoxemia while lateral.   Events ? The polysomnogram revealed a presence of 1 obstructive, - central, and - mixed apneas resulting in an apnea index of 0.2 events per hour. There were 66 obstructive hypopneas and - central hypopneas resulting in an obstructive hypopnea index of 10.2 and central hypopnea index of - events per hour. The combined apnea/hypopnea index was 10.3 events per hour (central apnea/hypopnea index was - events per hour). The REM AHI was 36.5 events per hour (all lateral). The supine AHI was 10.6 events per hour. The RERA index was 0.8 events per hour.  The RDI was 11.1 events per hour.  Snoring - was reported as moderate-loud.  Respiratory rate and  pattern - was notable for normal respiratory rate and pattern.  Sustained Sleep Associated Hypoventilation - Transcutaneous carbon dioxide monitoring was not used.  Sleep Associated Hypoxemia - (Greater than 5 minutes O2 sat at or below 88%) was present. Baseline oxygen saturation was 93.7%. Lowest oxygen saturation was 82.0%. Time spent less than or equal to 88% was 7.2 minutes. Time spent less than or equal to 89% was 11.9 minutes.     Movement Activity: Elevated PLMs  Periodic Limb Activity - There were 857 PLMs during the entire study. The PLM index was 132.4 movements per hour. The PLM Arousal Index was 13.7 per hour.  REM EMG Activity - Excessive muscle activity was not present.  Nocturnal Behavior - Abnormal sleep related behaviors were not noted.  Bruxism - None apparent.     Cardiac Summary: Limited one lead EKG with signal often obscured by artifact.  Regardless the patient appeared to have predominantly narrow QRS complexes preceded by P waves suggestive of sinus rhythm.    The average pulse rate was 54.8 bpm. The minimum pulse rate was 47.0 bpm while the maximum pulse rate was 82.0 bpm.       Assessment:   Mild, predominantly REM related, KATINA with hypoxemia while lateral.   Elevated PLMs       She does have restless legs occasionally, but she has trouble saying how often. She is kicking a lot during the night according to her .      RespirZume Life Dreamstation 2 with pressures 8-20 cm  The compliance data shows that the patient used the CPAP for 25/30 nights, 73.3% of nights for >4 hours.  The 90th% pressure is 13.6 cm.  The average time in large leak is 1 min 19 sec.  The average nightly usage is 6:16.  The average AHI is 3.4/hr.     She sometimes removes the mask in her sleep. She just got a small mask, AirTouch N20. She had a medium.   She is comfortable with the pressures. She notices some mask leak, but can't tell where.   She has been sleeping in a recliner as she recovers from knee surgery.  There is evidence of mouth leak. She does get dry mouth. The download shows some evidence of mouth leak. She has not used the chin strap for years.   I had her try on the medium and it fit well. She had it on a little tighter than necessary.      Past medical/surgical history, family history, social history, medications and allergies were reviewed.      Problem List:  Patient Active Problem List    Diagnosis Date Noted    KATINA (obstructive sleep apnea) 05/02/2019     Priority: Medium        Impression/Plan:    (G47.33) KATINA (obstructive sleep apnea)  (primary encounter diagnosis)  Comment: Aviva's sleep study showed mild apnea on average. Her AHI was 10.3/hr. She was lateral for most of the night (17 min supine). Her apnea was almost exclusively occurring in REM. Her REM AHI was 36/hr. She had mild hypoxemia as a function of the apnea. This suggests positional restriction is not a viable treatment option.   Plan: Continue auto CPAP 8-20 cm. We working on mask fit. Her medium AirTouch N20 seemed to work well, but she has some mouth leak. We discussed options including mouth tape, chin strap or full face mask.      (G25.81) Restless legs syndrome (RLS), (E61.1) Low iron  Comment: She has restlessness before bed but struggled to say how often.  Her father had RLS. Aviva had a PLM index of 132/hr, 13.7/hr were associated with arousals.  Plan: Ferritin        We may try gabapentin if ferritin is normal.         She will follow up with me in about 6 month(s).     Forty minutes spent with patient, all of which were spent face-to-face counseling, consulting, coordinating plan of care.      Bennett Goltz, PA-C    CC: No ref. provider found

## 2024-12-10 ENCOUNTER — OFFICE VISIT (OUTPATIENT)
Dept: SLEEP MEDICINE | Facility: CLINIC | Age: 84
End: 2024-12-10
Payer: COMMERCIAL

## 2024-12-10 VITALS
HEART RATE: 76 BPM | SYSTOLIC BLOOD PRESSURE: 116 MMHG | WEIGHT: 155 LBS | DIASTOLIC BLOOD PRESSURE: 66 MMHG | OXYGEN SATURATION: 99 % | BODY MASS INDEX: 27.46 KG/M2 | HEIGHT: 63 IN

## 2024-12-10 DIAGNOSIS — G25.81 RESTLESS LEGS SYNDROME (RLS): ICD-10-CM

## 2024-12-10 DIAGNOSIS — G47.33 OSA (OBSTRUCTIVE SLEEP APNEA): Primary | ICD-10-CM

## 2024-12-10 DIAGNOSIS — E61.1 LOW IRON: ICD-10-CM

## 2024-12-10 PROBLEM — F41.1 GAD (GENERALIZED ANXIETY DISORDER): Status: ACTIVE | Noted: 2023-07-28

## 2024-12-10 PROBLEM — I83.893 VARICOSE VEINS OF BOTH LOWER EXTREMITIES WITH COMPLICATIONS: Status: ACTIVE | Noted: 2021-09-03

## 2024-12-10 PROBLEM — Z96.651 S/P TOTAL KNEE ARTHROPLASTY, RIGHT: Status: ACTIVE | Noted: 2024-11-27

## 2024-12-10 PROCEDURE — 99215 OFFICE O/P EST HI 40 MIN: CPT | Performed by: PHYSICIAN ASSISTANT

## 2024-12-10 PROCEDURE — G2211 COMPLEX E/M VISIT ADD ON: HCPCS | Performed by: PHYSICIAN ASSISTANT

## 2024-12-10 ASSESSMENT — SLEEP AND FATIGUE QUESTIONNAIRES
HOW LIKELY ARE YOU TO NOD OFF OR FALL ASLEEP WHILE WATCHING TV: SLIGHT CHANCE OF DOZING
HOW LIKELY ARE YOU TO NOD OFF OR FALL ASLEEP IN A CAR, WHILE STOPPED FOR A FEW MINUTES IN TRAFFIC: WOULD NEVER DOZE
HOW LIKELY ARE YOU TO NOD OFF OR FALL ASLEEP WHILE SITTING AND TALKING TO SOMEONE: WOULD NEVER DOZE
HOW LIKELY ARE YOU TO NOD OFF OR FALL ASLEEP WHILE SITTING QUIETLY AFTER LUNCH WITHOUT ALCOHOL: SLIGHT CHANCE OF DOZING
HOW LIKELY ARE YOU TO NOD OFF OR FALL ASLEEP WHILE LYING DOWN TO REST IN THE AFTERNOON WHEN CIRCUMSTANCES PERMIT: HIGH CHANCE OF DOZING
HOW LIKELY ARE YOU TO NOD OFF OR FALL ASLEEP WHILE SITTING AND READING: SLIGHT CHANCE OF DOZING
HOW LIKELY ARE YOU TO NOD OFF OR FALL ASLEEP WHILE SITTING INACTIVE IN A PUBLIC PLACE: SLIGHT CHANCE OF DOZING
HOW LIKELY ARE YOU TO NOD OFF OR FALL ASLEEP WHEN YOU ARE A PASSENGER IN A CAR FOR AN HOUR WITHOUT A BREAK: HIGH CHANCE OF DOZING

## 2024-12-10 NOTE — PATIENT INSTRUCTIONS
To reduce mouth leak, you could consider a full face mask, chin strap or mouth tape (Micropore silk tape-available at [facility] or [facility]). If you continue to get dry mouth, that suggests you are still having air escape out of your mouth.    I placed an order for a blood draw to check your ferritin, which is a measure of your iron stores. Low iron can sometimes contribute to restless legs. Schedule a lab only appointment at a [facility]. [phone]. You can schedule at the [facility] lab in the [address] [place] [address]. Schedule that no earlier than the beginning of January.

## 2024-12-10 NOTE — NURSING NOTE
"Chief Complaint   Patient presents with    CPAP Follow Up    Study Results       Initial /66   Pulse 76   Ht 1.6 m (5' 3\")   Wt 70.3 kg (155 lb)   SpO2 99%   BMI 27.46 kg/m   Estimated body mass index is 27.46 kg/m  as calculated from the following:    Height as of this encounter: 1.6 m (5' 3\").    Weight as of this encounter: 70.3 kg (155 lb).    Medication Reconciliation: complete  ESS 10  Ange Gonzales MA   "

## 2025-01-12 ENCOUNTER — HEALTH MAINTENANCE LETTER (OUTPATIENT)
Age: 85
End: 2025-01-12

## 2025-01-13 DIAGNOSIS — R79.89 HIGH SERUM FERRITIN: ICD-10-CM

## 2025-01-13 DIAGNOSIS — G25.81 RESTLESS LEGS SYNDROME (RLS): Primary | ICD-10-CM

## 2025-07-07 ASSESSMENT — SLEEP AND FATIGUE QUESTIONNAIRES
HOW LIKELY ARE YOU TO NOD OFF OR FALL ASLEEP WHILE SITTING INACTIVE IN A PUBLIC PLACE: MODERATE CHANCE OF DOZING
HOW LIKELY ARE YOU TO NOD OFF OR FALL ASLEEP WHILE SITTING QUIETLY AFTER LUNCH WITHOUT ALCOHOL: HIGH CHANCE OF DOZING
HOW LIKELY ARE YOU TO NOD OFF OR FALL ASLEEP WHILE LYING DOWN TO REST IN THE AFTERNOON WHEN CIRCUMSTANCES PERMIT: HIGH CHANCE OF DOZING
HOW LIKELY ARE YOU TO NOD OFF OR FALL ASLEEP WHILE WATCHING TV: HIGH CHANCE OF DOZING
HOW LIKELY ARE YOU TO NOD OFF OR FALL ASLEEP WHEN YOU ARE A PASSENGER IN A CAR FOR AN HOUR WITHOUT A BREAK: HIGH CHANCE OF DOZING
HOW LIKELY ARE YOU TO NOD OFF OR FALL ASLEEP WHILE SITTING AND TALKING TO SOMEONE: MODERATE CHANCE OF DOZING
HOW LIKELY ARE YOU TO NOD OFF OR FALL ASLEEP IN A CAR, WHILE STOPPED FOR A FEW MINUTES IN TRAFFIC: SLIGHT CHANCE OF DOZING
HOW LIKELY ARE YOU TO NOD OFF OR FALL ASLEEP WHILE SITTING AND READING: MODERATE CHANCE OF DOZING

## 2025-07-07 NOTE — PROGRESS NOTES
CPAP Follow-Up Visit:    Date on this visit: 7/8/2025    Aviva Lundberg has a follow-up visit today to review her CPAP use for KATINA. Aviva Lundberg was initially seen for previously diagnosed KATINA and insomnia. Her medical history is non-contributory.     She had a sleep study at [facility] on 6/21/2018. Her AHI was 20.2/hr, RDI 46.3/hr, O2 eliza 78%. Her supine AHI was 25.4/hr and left lateral AHI was 5.9/hr (in 50.6 minutes of sleep, unclear if she had REM laterally). Her PLM index was 73/hr, but only 2.6/hr were associated with arousals. Her weight was 176 pounds at that time. She was initially studied because her  was bothered by her snoring.            PSG Results 7/14/2024:  Weight: 169 pounds  Respiration: Mild, predominantly REM related, KATINA with hypoxemia while lateral.   Events ? The polysomnogram revealed a presence of 1 obstructive, - central, and - mixed apneas resulting in an apnea index of 0.2 events per hour. There were 66 obstructive hypopneas and - central hypopneas resulting in an obstructive hypopnea index of 10.2 and central hypopnea index of - events per hour. The combined apnea/hypopnea index was 10.3 events per hour (central apnea/hypopnea index was - events per hour). The REM AHI was 36.5 events per hour (all lateral). The supine AHI was 10.6 events per hour. The RERA index was 0.8 events per hour.  The RDI was 11.1 events per hour.  Sleep Associated Hypoxemia - (Greater than 5 minutes O2 sat at or below 88%) was present. Baseline oxygen saturation was 93.7%. Lowest oxygen saturation was 82.0%. Time spent less than or equal to 88% was 7.2 minutes. Time spent less than or equal to 89% was 11.9 minutes.     Movement Activity: Elevated PLMs  Periodic Limb Activity - There were 857 PLMs during the entire study. The PLM index was 132.4 movements per hour. The PLM Arousal Index was 13.7 per hour.    She does have restless legs occasionally, but she has trouble saying how  often. She is kicking a lot during the night according to her .             Respironics Dreamstation 2 with pressures 8-20 cm  The compliance data shows that the patient used the CPAP for 28/30 nights, 90% of nights for >4 hours.  The 90th% pressure is 16.1 cm.  The average time in large leak is 1 min 13 sec.  The average nightly usage is 5:49.  The average AHI is 3.3/hr.         Sometimes she does fine with CPAP. Other times, she wakes and checks if she got 4 hours in so she can take it off.   She says she is not very good about washing everything daily. She did not realize she did not have to wash it that often.   Her  says the mask leaks and is very loud at least once a week. Her  says she also breathes mostly through her mouth. The download only shows occasional periods (maybe an hour per night on average) of mild leak consistent with mouth leak.   She does not really notice a benefit to using CPAP.    Has been sleeping in a recliner. She had knee replacement on her right leg. She may need her left knee replaced.   Ferritin was 268 on 6/9/25.   She worries about her transgender child and [employer]'s threats of deporting transgender people.            No specialty comments available.    Do you use a CPAP Machine at home: (Patient-Rptd) Yes  Overall, on a scale of 0-10 how would you rate your CPAP (0 poor, 10 great):      What type of mask do you use: AirFit N20  Is your mask comfortable: (Patient-Rptd) Yes  If not, why:    How often do you replace supplies:    Is your mask leaking: (Patient-Rptd) Yes  If yes, where do you feel it:    How many night per week does the mask leak (0-7):      Do you notice snoring with mask on: (Patient-Rptd) No  Do you notice gasping arousals with mask on:    Are you having significant oral or nasal dryness: (Patient-Rptd) Yes most of the time.  Are you using the humidifier: yes  Does the water chamber run out before the night is over: often  Do you get condensation  in the mask or hose: mostly if overfilled.  Is the pressure setting comfortable:     If not, why:      Typical bedtime: (Patient-Rptd) 9 - 10 pm  Sleep latency on PAP therapy: (Patient-Rptd) Aprox half hour  Typical wake time: (Patient-Rptd) 11 am. Her  wakes her at 7 AM and then she goes to the recliner and falls asleep watching mass on TV.  She says waking more in the night is just in the last couple of weeks. However, she had complaints of insomnia when I first met her a year ago.   How many hours on average per night are you using PAP therapy: (Patient-Rptd) 4 to 7  How many hours are you sleeping per night: (Patient-Rptd) 12  Do you feel well rested in the morning: (Patient-Rptd) No    Naps: If she sits to watch TV or read, she will fall asleep in the afternoon as well.           Weight change since sleep study: 162 lbs      Past medical/surgical history, family history, social history, medications and allergies were reviewed.      Problem List:  Patient Active Problem List    Diagnosis Date Noted    S/P total knee arthroplasty, right 11/27/2024     Priority: Medium    LACIE (generalized anxiety disorder) 07/28/2023     Priority: Medium    Varicose veins of both lower extremities with complications 09/03/2021     Priority: Medium     Added automatically from request for surgery [id number]      KATINA (obstructive sleep apnea) 05/02/2019     Priority: Medium        Impression/Plan:    (G47.33) KATINA (obstructive sleep apnea)  (primary encounter diagnosis)  Comment: Aviva has been using CPAP regularly but struggles with it at times. She has significant residual sleepiness in the daytime but also has insomnia at night. She seems to have a dysregulated circadian rhythm. Her  reports mouth leak, but there is not much evidence of significant leak on the download. There are occasions where the pressure escalates for RERAs. I am skeptical about the accuracy of the report as they do not appear to cluster in a  REM-related pattern like her sleep study showed.  I wonder if leg movements are disrupting her sleep, causing instability of her breathing.  Plan: Changed pressures to 8-15 cm. Consider trying a full face mask.     (G25.81) Restless legs syndrome (RLS)  Comment: She had a PLM index of 132/hr, 13.7/hr associated with arousals. She has RLS occasionally as well. She was hesitant to try gabapentin because she knows someone who did not respond well to it.  Ferritin was 268 last month.  Plan: Try magnesium glycinate 200 mg and/or Germán's Restful Legs. I think a low dose of gabapentin might help her knee pain, RLS and possibly anxiety, so I will recommend that if she does not respond to magnesium or Germán's.    (F51.04) Chronic insomnia  Comment: Aviva has difficulty sleeping at night, although seems to struggle to estimate how much time she spends awake. She then sleeps through a lot of the day.   Plan: Try to avoid sleeping in the daytime. Stay active to avoid inadvertent dozing. Only allow sleep between 9-10 PM and 7 AM. May try melatonin to help try to stabilize her circadian rhythms. Therapy may be helpful for her worrying at night.     She will follow up with me in about 9 month(s).     49 minutes were spent on the date of the encounter doing chart review, history and exam, documentation and further activities as noted above.     Bennett Goltz, PA-C    CC: No ref. provider found

## 2025-07-08 ENCOUNTER — OFFICE VISIT (OUTPATIENT)
Dept: SLEEP MEDICINE | Facility: CLINIC | Age: 85
End: 2025-07-08
Payer: COMMERCIAL

## 2025-07-08 VITALS
HEIGHT: 63 IN | WEIGHT: 162.4 LBS | BODY MASS INDEX: 28.77 KG/M2 | DIASTOLIC BLOOD PRESSURE: 88 MMHG | HEART RATE: 61 BPM | OXYGEN SATURATION: 100 % | SYSTOLIC BLOOD PRESSURE: 147 MMHG

## 2025-07-08 DIAGNOSIS — F51.04 CHRONIC INSOMNIA: ICD-10-CM

## 2025-07-08 DIAGNOSIS — G47.33 OSA (OBSTRUCTIVE SLEEP APNEA): Primary | ICD-10-CM

## 2025-07-08 DIAGNOSIS — G25.81 RESTLESS LEGS SYNDROME (RLS): ICD-10-CM

## 2025-07-08 PROCEDURE — 1125F AMNT PAIN NOTED PAIN PRSNT: CPT | Performed by: PHYSICIAN ASSISTANT

## 2025-07-08 PROCEDURE — 3079F DIAST BP 80-89 MM HG: CPT | Performed by: PHYSICIAN ASSISTANT

## 2025-07-08 PROCEDURE — 3077F SYST BP >= 140 MM HG: CPT | Performed by: PHYSICIAN ASSISTANT

## 2025-07-08 PROCEDURE — 99215 OFFICE O/P EST HI 40 MIN: CPT | Performed by: PHYSICIAN ASSISTANT

## 2025-07-08 RX ORDER — ALENDRONATE SODIUM 5 MG
5 TABLET ORAL WEEKLY
COMMUNITY

## 2025-07-08 RX ORDER — ERGOCALCIFEROL (VITAMIN D2) 10 MCG
TABLET ORAL
COMMUNITY

## 2025-07-08 NOTE — NURSING NOTE
"Chief Complaint   Patient presents with    CPAP Follow Up     Waking up & taking off mask at night, difficulty cleaning/maintaining machine       Initial BP (!) 147/88   Pulse 61   Ht 1.6 m (5' 2.99\")   Wt 73.7 kg (162 lb 6.4 oz)   SpO2 100%   BMI 28.78 kg/m   Estimated body mass index is 28.78 kg/m  as calculated from the following:    Height as of this encounter: 1.6 m (5' 2.99\").    Weight as of this encounter: 73.7 kg (162 lb 6.4 oz).    Medication Reconciliation: complete  ESS: 19    DME: lorenzo Kramer, JODY      "

## 2025-07-08 NOTE — PATIENT INSTRUCTIONS
Consider 200 mg magnesium glycinate and/or Germán's Restful Legs (available over the counter).   Give that a week and let me know how that is working. If you are still having restless legs, we can try something else.  Try to avoid sleeping in the daytime. Stay active to avoid inadvertent dozing. Only allow sleep between 9-10 PM and 7 AM.